# Patient Record
Sex: FEMALE
[De-identification: names, ages, dates, MRNs, and addresses within clinical notes are randomized per-mention and may not be internally consistent; named-entity substitution may affect disease eponyms.]

---

## 2017-04-09 ENCOUNTER — HOSPITAL ENCOUNTER (EMERGENCY)
Dept: HOSPITAL 42 - ED | Age: 47
Discharge: HOME | End: 2017-04-09
Payer: COMMERCIAL

## 2017-04-09 VITALS
RESPIRATION RATE: 16 BRPM | SYSTOLIC BLOOD PRESSURE: 136 MMHG | DIASTOLIC BLOOD PRESSURE: 80 MMHG | OXYGEN SATURATION: 98 % | HEART RATE: 84 BPM

## 2017-04-09 VITALS — TEMPERATURE: 97.7 F

## 2017-04-09 VITALS — BODY MASS INDEX: 60.2 KG/M2
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
## 2017-04-09 DIAGNOSIS — Z23: ICD-10-CM

## 2017-04-09 DIAGNOSIS — S69.91XA: Primary | ICD-10-CM
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
## 2017-04-09 LAB
ADD MANUAL DIFF?: NO
ALBUMIN/GLOB SERPL: 1 {RATIO} (ref 1.1–1.8)
ALP SERPL-CCNC: 124 U/L (ref 38–133)
ALT SERPL-CCNC: 38 U/L (ref 7–56)
AST SERPL-CCNC: 36 U/L (ref 15–39)
BASOPHILS # BLD AUTO: 0.01 K/MM3 (ref 0–2)
BASOPHILS NFR BLD: 0.1 % (ref 0–3)
BILIRUB SERPL-MCNC: 0.6 MG/DL (ref 0.2–1.3)
BUN SERPL-MCNC: 13 MG/DL (ref 7–21)
CALCIUM SERPL-MCNC: 9.7 MG/DL (ref 8.4–10.5)
CHLORIDE SERPL-SCNC: 100 MMOL/L (ref 98–107)
CO2 SERPL-SCNC: 26 MMOL/L (ref 21–33)
EOSINOPHIL # BLD: 0.2 10*3/UL (ref 0–0.7)
EOSINOPHIL NFR BLD: 2.2 % (ref 1.5–5)
ERYTHROCYTE [DISTWIDTH] IN BLOOD BY AUTOMATED COUNT: 16.4 % (ref 11.5–14.5)
GLOBULIN SER-MCNC: 4.2 GM/DL
GLUCOSE SERPL-MCNC: 141 MG/DL (ref 70–110)
GRANULOCYTES # BLD: 5.97 10*3/UL (ref 1.4–6.5)
GRANULOCYTES NFR BLD: 77.7 % (ref 50–68)
HCT VFR BLD CALC: 37.9 % (ref 36–48)
LYMPHOCYTES # BLD: 1.2 10*3/UL (ref 1.2–3.4)
LYMPHOCYTES NFR BLD AUTO: 16 % (ref 22–35)
MCH RBC QN AUTO: 27.3 PG (ref 25–35)
MCHC RBC AUTO-ENTMCNC: 32.5 G/DL (ref 31–37)
MCV RBC AUTO: 84.2 FL (ref 80–105)
MONOCYTES # BLD AUTO: 0.3 10*3/UL (ref 0.1–0.6)
MONOCYTES NFR BLD: 4 % (ref 1–6)
PLATELET # BLD: 295 10^3/UL (ref 120–450)
PMV BLD AUTO: 9.8 FL (ref 7–11)
POTASSIUM SERPL-SCNC: 4.3 MMOL/L (ref 3.6–5)
PROT SERPL-MCNC: 8.6 G/DL (ref 5.8–8.3)
SODIUM SERPL-SCNC: 138 MMOL/L (ref 132–148)
WBC # BLD AUTO: 7.7 10^3/UL (ref 4.5–11)

## 2017-04-09 NOTE — ED PDOC
Arrival/HPI





- General


Chief Complaint: Finger,Hand,&Wrist


Time Seen by Provider: 04/09/17 17:05


Historian: Patient





- History of Present Illness


Narrative History of Present Illness (Text): 


04/09/17 17:05


A 46 year old female, whose past medical history includes hypertension, 

lymphedema, Anemia (with previous blood transfusions) and DVT with IVC filter (

on xarelto), presents to the emergency department complaining of pain and 

swelling of the right middle finger since yesterday. Patient states yesterday 

while shopping she scraped her finger on a metal object. She reports this 

morning upon walking she felt weak and had a subjective fever with chills. 

Patient notes pain and swelling to the finger has decreased after taking Advil. 

She notes some nausea, chronic shortness of breath, and chronic lower extremity 

edema but denies any vomiting or any other complaints at this time. 








Time/Duration: 24 hours


Symptom Onset: Sudden


Symptom Course: Unchanged


Quality: Other ("pain")


Activities at Onset: Rest


Modifying Factors (Text): 


better with Advil


Context: Other


Associated Symptoms (Text): 


patient has nausea, fever, and chills





Past Medical History





- Provider Review


Nursing Documentation Reviewed: Yes





- Infectious Disease


Hx of Infectious Diseases: None





- Cardiac


Hx Hypertension: Yes





- Pulmonary


Hx Respiratory Disorders: No





- Neurological


Hx Neurological Disorder: No





- HEENT


Hx HEENT Disorder: No





- Renal


Hx Renal Disorder: No





- Endocrine/Metabolic


Hx Endocrine Disorders: No





- Hematological/Oncological


Hx Anemia: Yes





- Integumentary


Hx Dermatological Disorder: Yes


Other/Comment: b/l lower extremity edema.





- Musculoskeletal/Rheumatological


Hx Musculoskeletal Disorders: No





- Gastrointestinal


Hx Gastrointestinal Disorders: No





- Genitourinary/Gynecological


Hx Genitourinary Disorders: No





- Psychiatric


Hx Psychophysiologic Disorder: No


Hx Substance Use: No





- Surgical History


Other/Comment: B/LLE DVT, IVC filter, PE





- Anesthesia


Hx Anesthesia: No


Hx Anesthesia Reactions: No


Hx Malignant Hyperthermia: No





Family/Social History





- Physician Review


Nursing Documentation Reviewed: Yes


Family/Social History: Unknown Family HX


Smoking Status: Former Smoker


Hx Alcohol Use: No


Hx Substance Use: No





Allergies/Home Meds


Allergies/Adverse Reactions: 


Allergies





Penicillins Allergy (Verified 04/09/17 16:50)


 RASH











Review of Systems





- Physician Review


All systems were reviewed & negative as marked: Yes





- Review of Systems


Constitutional: Fevers, Other (chills)


Respiratory: SOB (chronic)


Gastrointestinal: Nausea


Musculoskeletal: Other (right hand middle finger pain and swelling)





Physical Exam


Vital Signs Reviewed: Yes


Vital Signs











  Temp Pulse Resp BP Pulse Ox


 


 04/09/17 16:51  97.7 F  89  19  133/76  97











Temperature: Afebrile


Blood Pressure: Normal


Pulse: Regular


Respiratory Rate: Normal


Appearance: Positive for: Well-Appearing, Non-Toxic, Comfortable, Other (

morbidly obese)


Pain Distress: None


Mental Status: Positive for: Alert and Oriented X 3





- Systems Exam


Head: Present: Atraumatic, Normocephalic


Pupils: Present: PERRL


Conjunctiva: Present: Normal


Mouth: Present: Moist Mucous Membranes


Neck: Present: Normal Range of Motion


Respiratory/Chest: Present: Clear to Auscultation, Good Air Exchange.  No: 

Respiratory Distress, Accessory Muscle Use


Cardiovascular: Present: Regular Rate and Rhythm, Normal S1, S2.  No: Murmurs


Abdomen: Present: Normal Bowel Sounds.  No: Tenderness, Distention, Peritoneal 

Signs


Back: Present: Normal Inspection


Upper Extremity: Present: Normal ROM, NORMAL PULSES, Neurovascularly Intact.  No

: Cyanosis, Edema, Tenderness, Erythema, Temperature Abnormalties


Lower Extremity: Present: Edema (lymphedema bilaterally)


Neurological: Present: GCS=15, CN II-XII Intact, Speech Normal


Skin: Present: Warm, Dry, Normal Color.  No: Rashes


Psychiatric: Present: Alert, Oriented x 3, Normal Insight, Normal Concentration





Medical Decision Making


ED Course and Treatment: 


04/09/17 17:42


Impression:


46 year old female complains of middle finger pain of right hand. 





Differential Diagnosis included but are not limited to: fracture vs. 

musculoskeletal





Plan:


-- Right Hand X-ray


-- Labs


-- TDAP Vaccine


-- Reassess and disposition





Progress Notes:





04/09/17 19:22


Right hand x-ray: As read by me, no foreign body or fracture.








04/09/17 20:18


Patient with noted history.  Vitals and labs are unremarkable.  X-ray shows no 

FB or fx.  Will give abx prophylaxis.  Tetanus updated - ok for d/c.





- Lab Interpretations


Lab Results: 








 04/09/17 18:15 





 04/09/17 18:15 





 Lab Results





04/09/17 18:15: WBC 7.7, RBC 4.50, Hgb 12.3, Hct 37.9, MCV 84.2, MCH 27.3, MCHC 

32.5, RDW 16.4 H, Plt Count 295, MPV 9.8, Gran % 77.7 H, Lymph % (Auto) 16.0 L, 

Mono % (Auto) 4.0, Eos % (Auto) 2.2, Baso % (Auto) 0.1, Gran # 5.97, Lymph # 1.2

, Mono # 0.3, Eos # 0.2, Baso # 0.01, Sodium 138, Potassium 4.3, Chloride 100, 

Carbon Dioxide 26, Anion Gap 16, BUN 13, Creatinine 0.7, Est GFR (African Amer) 

> 60, Est GFR (Non-Af Amer) > 60, Random Glucose 141 H, Calcium 9.7, Total 

Bilirubin 0.6, AST 36, ALT 38, Alkaline Phosphatase 124, Total Protein 8.6 H, 

Albumin 4.3, Globulin 4.2, Albumin/Globulin Ratio 1.0 L








I have reviewed the lab results: Yes





- RAD Interpretation


Radiology Orders: 








04/09/17 17:13


HAND RIGHT 3 VIEWS [RAD] Stat 














- Medication Orders


Current Medication Orders: 











Discontinued Medications





Tetanus/Reduced Diphtheria/Acell Pertussis (Boostrix Vaccine Inj)  0.5 ml IM 

.ONCE ONE


   Stop: 04/09/17 17:14


   Last Admin: 04/09/17 18:18  Dose: 0.5 ML





MAR Immunization Data


 Document     04/09/17 18:18  HI  (Rec: 04/09/17 18:18  HI  [facility] [place]-[id number])


     Immunization Data


      Vaccine Lot Number                         [id number]


      Vaccine Expiration Date                    03/21/19


      Site Given                                 Right Arm














- Scribe Statement


The provider has reviewed the documentation as recorded by the Carla Dobbs training under Karyna Valentino





All medical record entries made by the Carla were at my direction and 

personally dictated by me. I have reviewed the chart and agree that the record 

accurately reflects my personal performance of the history, physical exam, 

medical decision making, and the department course for this patient. I have 

also personally directed, reviewed, and agree with the discharge instructions 

and disposition.





Disposition/Present on Arrival





- Present on Arrival


Any Indicators Present on Arrival: No


History of DVT/PE: No


History of Uncontrolled Diabetes: No


Urinary Catheter: No


History of Decub. Ulcer: No


History Surgical Site Infection Following: None





- Disposition


Have Diagnosis and Disposition been Completed?: Yes


Diagnosis: 


 Injury of right hand


Disposition: HOME/ ROUTINE


Disposition Time: 20:20


Patient Plan: Discharge


Condition: GOOD


Additional Instructions: 


Take the antibiotics as prescribed.  Follow up with your primary care doctor.  

Return to the emergency department if any new concerning symptoms.


Prescriptions: 


Loratadine [Claritin] 1 tab PO DAILY PRN #30 tab


 PRN Reason: Allergy Symptoms


Azithromycin [Zithromax] 2 tab PO DAILY #6 tab


Referrals: 


PCP,NO [Primary Care Provider] - Follow up with primary

## 2017-04-10 NOTE — RAD
PROCEDURE:  Right Hand Radiographs.



HISTORY:

R hand puncture wound between 2nd/3rd finger



COMPARISON:

None.



FINDINGS:



BONES:

Bone alignment and mineralization are normal. No fracture. 



JOINTS:

Normal. No osteoarthritic changes. 



SOFT TISSUES:

Normal. There is no radiopaque foreign body. 



OTHER FINDINGS:

None.



IMPRESSION:

No acute fracture or dislocation. No radiopaque foreign body.

## 2017-05-19 ENCOUNTER — HOSPITAL ENCOUNTER (EMERGENCY)
Dept: HOSPITAL 42 - ED | Age: 47
LOS: 1 days | Discharge: HOME | End: 2017-05-20
Payer: COMMERCIAL

## 2017-05-19 VITALS — TEMPERATURE: 98 F

## 2017-05-19 VITALS — BODY MASS INDEX: 71.6 KG/M2

## 2017-05-19 DIAGNOSIS — Z87.891: ICD-10-CM

## 2017-05-19 DIAGNOSIS — I10: ICD-10-CM

## 2017-05-19 DIAGNOSIS — Z79.01: ICD-10-CM

## 2017-05-19 DIAGNOSIS — Z86.718: ICD-10-CM

## 2017-05-19 DIAGNOSIS — R60.9: Primary | ICD-10-CM

## 2017-05-19 LAB
ADD MANUAL DIFF?: NO
ALBUMIN/GLOB SERPL: 1.1 {RATIO} (ref 1.1–1.8)
ALP SERPL-CCNC: 106 U/L (ref 38–133)
ALT SERPL-CCNC: 40 U/L (ref 7–56)
AST SERPL-CCNC: 38 U/L (ref 15–39)
BASOPHILS # BLD AUTO: 0.01 K/MM3 (ref 0–2)
BASOPHILS NFR BLD: 0.1 % (ref 0–3)
BILIRUB SERPL-MCNC: 0.9 MG/DL (ref 0.2–1.3)
BUN SERPL-MCNC: 16 MG/DL (ref 7–21)
CALCIUM SERPL-MCNC: 9.6 MG/DL (ref 8.4–10.5)
CHLORIDE SERPL-SCNC: 98 MMOL/L (ref 98–107)
CO2 SERPL-SCNC: 28 MMOL/L (ref 21–33)
EOSINOPHIL # BLD: 0.2 10*3/UL (ref 0–0.7)
EOSINOPHIL NFR BLD: 2.3 % (ref 1.5–5)
ERYTHROCYTE [DISTWIDTH] IN BLOOD BY AUTOMATED COUNT: 15.5 % (ref 11.5–14.5)
GLOBULIN SER-MCNC: 3.8 GM/DL
GLUCOSE SERPL-MCNC: 173 MG/DL (ref 70–110)
GRANULOCYTES # BLD: 7.9 10*3/UL (ref 1.4–6.5)
GRANULOCYTES NFR BLD: 82.7 % (ref 50–68)
HCT VFR BLD CALC: 37.4 % (ref 36–48)
LYMPHOCYTES # BLD: 1.1 10*3/UL (ref 1.2–3.4)
LYMPHOCYTES NFR BLD AUTO: 11.8 % (ref 22–35)
MAGNESIUM SERPL-MCNC: 1.6 MG/DL (ref 1.7–2.2)
MCH RBC QN AUTO: 28.1 PG (ref 25–35)
MCHC RBC AUTO-ENTMCNC: 32.9 G/DL (ref 31–37)
MCV RBC AUTO: 85.6 FL (ref 80–105)
MONOCYTES # BLD AUTO: 0.3 10*3/UL (ref 0.1–0.6)
MONOCYTES NFR BLD: 3.1 % (ref 1–6)
PLATELET # BLD: 284 10^3/UL (ref 120–450)
PMV BLD AUTO: 9.4 FL (ref 7–11)
POTASSIUM SERPL-SCNC: 3.9 MMOL/L (ref 3.6–5)
PROT SERPL-MCNC: 8.1 G/DL (ref 5.8–8.3)
SODIUM SERPL-SCNC: 136 MMOL/L (ref 132–148)
TROPONIN I SERPL-MCNC: < 0.01 NG/ML
WBC # BLD AUTO: 9.6 10^3/UL (ref 4.5–11)

## 2017-05-19 PROCEDURE — 83615 LACTATE (LD) (LDH) ENZYME: CPT

## 2017-05-19 PROCEDURE — 80053 COMPREHEN METABOLIC PANEL: CPT

## 2017-05-19 PROCEDURE — 82550 ASSAY OF CK (CPK): CPT

## 2017-05-19 PROCEDURE — 85025 COMPLETE CBC W/AUTO DIFF WBC: CPT

## 2017-05-19 PROCEDURE — 99284 EMERGENCY DEPT VISIT MOD MDM: CPT

## 2017-05-19 PROCEDURE — 84484 ASSAY OF TROPONIN QUANT: CPT

## 2017-05-19 PROCEDURE — 83735 ASSAY OF MAGNESIUM: CPT

## 2017-05-19 PROCEDURE — 93005 ELECTROCARDIOGRAM TRACING: CPT

## 2017-05-19 PROCEDURE — 93970 EXTREMITY STUDY: CPT

## 2017-05-19 PROCEDURE — 83880 ASSAY OF NATRIURETIC PEPTIDE: CPT

## 2017-05-19 PROCEDURE — 71010: CPT

## 2017-05-19 PROCEDURE — 96374 THER/PROPH/DIAG INJ IV PUSH: CPT

## 2017-05-20 VITALS
SYSTOLIC BLOOD PRESSURE: 128 MMHG | OXYGEN SATURATION: 98 % | RESPIRATION RATE: 18 BRPM | DIASTOLIC BLOOD PRESSURE: 85 MMHG | HEART RATE: 95 BPM

## 2017-05-20 NOTE — RAD
HISTORY:

leg swelling  



COMPARISON:

No prior. 



FINDINGS:



LUNGS:

No active pulmonary disease.



PLEURA:

No significant pleural effusion identified, no pneumothorax apparent.



CARDIOVASCULAR:

Normal.



OSSEOUS STRUCTURES:

No significant abnormalities.



VISUALIZED UPPER ABDOMEN:

Normal.



OTHER FINDINGS:

None.



IMPRESSION:

No active disease.

## 2017-05-20 NOTE — ED PDOC
Arrival/HPI





- General


Chief Complaint: Lower Extremity Problem/Injury


Time Seen by Provider: 05/19/17 21:35


Historian: Patient





- History of Present Illness


Narrative History of Present Illness (Text): 


05/20/17 00:04


A 46 year old female, obese, presents to the emergency department complaining 

of increasing leg pain and swelling over the past several days. Patient denies 

chest pain, shortness of breath, fever, cough or any other complaints at this 

time. Patient has a history of DVT in the past. Patient reports is non 

compliant with Xarelto for 7 months. 


Time/Duration: < week


Symptom Onset: Sudden


Symptom Course: Unchanged


Activities at Onset: Rest


Context: Home


Associated Symptoms (Text): 


none





Past Medical History





- Provider Review


Nursing Documentation Reviewed: Yes





- Infectious Disease


Hx of Infectious Diseases: None





- Cardiac


Hx Hypertension: Yes





- Pulmonary


Hx Respiratory Disorders: Yes


Other/Comment: "spots in the lungs"





- Neurological


Hx Neurological Disorder: No





- HEENT


Hx HEENT Disorder: No





- Renal


Hx Renal Disorder: No





- Endocrine/Metabolic


Hx Endocrine Disorders: No





- Hematological/Oncological


Hx Anemia: Yes


Other/Comment: DVT





- Integumentary


Hx Dermatological Disorder: Yes


Other/Comment: b/l lower extremity edema.





- Musculoskeletal/Rheumatological


Hx Musculoskeletal Disorders: No





- Gastrointestinal


Hx Gastrointestinal Disorders: No





- Genitourinary/Gynecological


Hx Genitourinary Disorders: No





- Psychiatric


Hx Psychophysiologic Disorder: No


Hx Substance Use: No





- Surgical History


Other/Comment: B/LLE DVT, IVC filter, PE





- Anesthesia


Hx Anesthesia: Yes


Hx Anesthesia Reactions: No


Hx Malignant Hyperthermia: No





Family/Social History





- Physician Review


Nursing Documentation Reviewed: Yes


Family/Social History: No Known Family HX


Smoking Status: Former Smoker


Hx Alcohol Use: No


Hx Substance Use: No





Allergies/Home Meds


Allergies/Adverse Reactions: 


Allergies





Penicillins Allergy (Verified 04/09/17 16:50)


 RASH











Review of Systems





- Physician Review


All systems were reviewed & negative as marked: Yes





- Review of Systems


Constitutional: absent: Fevers


Respiratory: absent: SOB, Cough


Cardiovascular: absent: Chest Pain


Musculoskeletal: Other (leg pain and swelling)





Physical Exam


Vital Signs Reviewed: Yes


Vital Signs











  Temp Pulse Resp BP Pulse Ox


 


 05/20/17 01:10   95 H  18  128/85  98


 


 05/19/17 20:57  98.0 F  109 H  20  121/85  96











Temperature: Afebrile


Blood Pressure: Normal


Pulse: Tachycardic


Respiratory Rate: Normal


Appearance: Positive for: Non-Toxic, Comfortable, Other (obese)


Pain Distress: None


Mental Status: Positive for: Alert and Oriented X 3





- Systems Exam


Head: Present: Atraumatic, Normocephalic


Pupils: Present: PERRL


Extroacular Muscles: Present: EOMI


Conjunctiva: Present: Normal


Mouth: Present: Moist Mucous Membranes


Neck: Present: Normal Range of Motion


Respiratory/Chest: Present: Clear to Auscultation, Good Air Exchange.  No: 

Respiratory Distress, Accessory Muscle Use


Cardiovascular: Present: Tachycardic


Abdomen: Present: Normal Bowel Sounds.  No: Tenderness, Distention, Peritoneal 

Signs


Back: Present: Normal Inspection


Upper Extremity: Present: Normal Inspection.  No: Cyanosis, Edema


Lower Extremity: Present: Edema (positive), Other (chronic skin changes; good 

capillary refill; good distal pulses)


Neurological: Present: GCS=15, CN II-XII Intact, Speech Normal


Skin: Present: Warm, Dry, Normal Color.  No: Rashes


Psychiatric: Present: Alert, Oriented x 3, Normal Insight, Normal Concentration





Medical Decision Making


ED Course and Treatment: 


05/19/17 23:55


Impression:


A 46 year old female with leg pain and swelling.





Differential Diagnosis included but are not limited to:  





Plan:


-- EKG


-- chest xray


-- US lower extremity


-- labs


-- Urinalysis


-- Toradol


-- Reassess and disposition





Prior Visits:


Notes and results from previous visits were reviewed.


Patient last reported to the emergency department on 4/9/17 for evaluation of 

pain and swelling of the right middle finger. Patient was discharged and 

advised to follow up with PMD and to take antibiotics as prescribed.





Progress Notes:


EKG:


Ordered, reviewed, and independently interpreted the EKG.


Rate : 97 BPM


Rhythm : NSR


Interpretation : Normal axis, normal intervals 


Comparison : No previous EKG for comparison.











- Lab Interpretations


Lab Results: 








 05/19/17 22:55 





 05/19/17 22:55 





 Lab Results





05/19/17 22:55: Sodium 136, Potassium 3.9, Chloride 98, Carbon Dioxide 28, 

Anion Gap 14, BUN 16, Creatinine 0.8, Est GFR (African Amer) > 60, Est GFR (Non-

Af Amer) > 60, Random Glucose 173 H, Calcium 9.6, Magnesium 1.6 L, Total 

Bilirubin 0.9, AST 38, ALT 40, Alkaline Phosphatase 106, Lactate Dehydrogenase 

428, Total Creatine Kinase 48, Troponin I < 0.01, NT-Pro-B Natriuret Pep 20.8, 

Total Protein 8.1, Albumin 4.3, Globulin 3.8, Albumin/Globulin Ratio 1.1


05/19/17 22:55: WBC 9.6  D, RBC 4.37, Hgb 12.3, Hct 37.4, MCV 85.6, MCH 28.1, 

MCHC 32.9, RDW 15.5 H, Plt Count 284, MPV 9.4, Gran % 82.7 H, Lymph % (Auto) 

11.8 L, Mono % (Auto) 3.1, Eos % (Auto) 2.3, Baso % (Auto) 0.1, Gran # 7.90 H, 

Lymph # 1.1 L, Mono # 0.3, Eos # 0.2, Baso # 0.01








I have reviewed the lab results: Yes





- RAD Interpretation


Radiology Orders: 








05/19/17 21:40


CHEST PORTABLE [RAD] Stat 





05/19/17 22:22


DUPLEX LOWER EXTRM VEIN BILAT [US] Stat 














- EKG Interpretation


Interpreted by ED Physician: Yes


Type: 12 lead EKG





- Medication Orders


Current Medication Orders: 











Discontinued Medications





Ketorolac Tromethamine (Toradol)  30 mg IVP STAT STA


   Stop: 05/19/17 22:29


   Last Admin: 05/20/17 01:09  Dose: 30 mg











- Scribe Statement


The provider has reviewed the documentation as recorded by the Carla Dobbs





Provider Scribe Attestation:


All medical record entries made by the Carla were at my direction and 

personally dictated by me. I have reviewed the chart and agree that the record 

accurately reflects my personal performance of the history, physical exam, 

medical decision making, and the department course for this patient. I have 

also personally directed, reviewed, and agree with the discharge instructions 

and disposition.











Disposition/Present on Arrival





- Present on Arrival


Any Indicators Present on Arrival: No


History of DVT/PE: No


History of Uncontrolled Diabetes: No


Urinary Catheter: No


History of Decub. Ulcer: No


History Surgical Site Infection Following: None





- Disposition


Have Diagnosis and Disposition been Completed?: Yes


Diagnosis: 


 Lower extremity edema





Disposition: HOME/ ROUTINE


Disposition Time: 00:20


Condition: GOOD


Discharge Instructions (ExitCare):  Leg Edema (ED)


Additional Instructions: 





Thank you for letting us take care of you today. Your provider was Dr. Benito. You were treated for leg edema. The emergency medical care you 

received today was directed at your acute symptoms. If you were prescribed any 

medication, please fill it and take as directed. It may take several days for 

your symptoms to resolve. Return to the Emergency Department if your symptoms 

worsen, do not improve, or if you have any other problems.





Please contact your doctor or call one of the physicians/clinics you have been 

referred to that are listed on the Patient Visit Information form that is 

included in your discharge packet. Bring any paperwork you were given at 

discharge with you along with any medications you are taking to your follow up 

visit. Our treatment cannot replace ongoing medical care by a primary care 

provider (PCP) outside of the emergency department.





Thank you for allowing the [facility] [employer] team to be part of your care today.














Follow up with your doctor in 3-4 days for re-evaluation.


Prescriptions: 


Bumetanide [Bumex] 1 mg PO DAILY #14 tab


Referrals: 


[employer] [facility], [Family Provider] - Follow up with primary

## 2017-05-20 NOTE — CARD
--------------- APPROVED REPORT --------------





EKG Measurement

Heart [id number]

TX [id number]

[id number]

[id number]

[id number]



<Conclusion>

Normal sinus rhythm

Minimal voltage criteria for LVH, may be normal variant

Borderline ECG

## 2017-05-21 NOTE — US
HISTORY:

Leg pain and swelling. Evaluate for DVT



PHYSICIAN(S):  Hesham Pardo MD.



TECHNIQUE:

Duplex sonography and color-flow Doppler with graded compression were 

used to evaluate the deep venous systems of both lower extremities. 

The exam is extremely limited by body habitus and edema. The lower 

femoral veins and tibial veins are not adequately seen



FINDINGS:

The visualized deep venous systems of both lower extremities are 

sonographically normal and compressible. Normal wave forms and 

augmentation are seen. There is no sonographic evidence for deep 

venous thrombosis in the visualized segments of both lower 

extremities.



IMPRESSION:

No sonographic evidence for deep venous thrombosis in the visualized 

segments of both lower extremities. 



Very limited study

## 2019-01-28 ENCOUNTER — HOSPITAL ENCOUNTER (EMERGENCY)
Dept: HOSPITAL 42 - ED | Age: 49
Discharge: HOME | End: 2019-01-28
Payer: COMMERCIAL

## 2019-01-28 VITALS — HEART RATE: 70 BPM | OXYGEN SATURATION: 98 %

## 2019-01-28 VITALS — RESPIRATION RATE: 18 BRPM | SYSTOLIC BLOOD PRESSURE: 116 MMHG | DIASTOLIC BLOOD PRESSURE: 96 MMHG

## 2019-01-28 VITALS — BODY MASS INDEX: 61.9 KG/M2

## 2019-01-28 DIAGNOSIS — I10: ICD-10-CM

## 2019-01-28 DIAGNOSIS — L03.116: Primary | ICD-10-CM

## 2019-01-28 DIAGNOSIS — E11.9: ICD-10-CM

## 2019-01-28 DIAGNOSIS — Z87.891: ICD-10-CM

## 2019-01-28 LAB
ALBUMIN SERPL-MCNC: 4.5 G/DL (ref 3–4.8)
ALBUMIN/GLOB SERPL: 1.1 {RATIO} (ref 1.1–1.8)
ALT SERPL-CCNC: 27 U/L (ref 7–56)
APTT BLD: 42.5 SECONDS (ref 26.9–38.3)
AST SERPL-CCNC: 27 U/L (ref 14–36)
BASOPHILS # BLD AUTO: 0.01 K/MM3 (ref 0–2)
BASOPHILS NFR BLD: 0.1 % (ref 0–3)
BUN SERPL-MCNC: 16 MG/DL (ref 7–21)
CALCIUM SERPL-MCNC: 9.9 MG/DL (ref 8.4–10.5)
EOSINOPHIL # BLD: 0.3 10*3/UL (ref 0–0.7)
EOSINOPHIL NFR BLD: 2.8 % (ref 1.5–5)
ERYTHROCYTE [DISTWIDTH] IN BLOOD BY AUTOMATED COUNT: 16 % (ref 11.5–14.5)
GFR NON-AFRICAN AMERICAN: > 60
HGB BLD-MCNC: 11.4 G/DL (ref 12–16)
INR PPP: 1.15
LYMPHOCYTES # BLD: 1.3 10*3/UL (ref 1.2–3.4)
LYMPHOCYTES NFR BLD AUTO: 13.9 % (ref 22–35)
MCH RBC QN AUTO: 26.2 PG (ref 25–35)
MCHC RBC AUTO-ENTMCNC: 30.9 G/DL (ref 31–37)
MCV RBC AUTO: 84.8 FL (ref 80–105)
MONOCYTES # BLD AUTO: 0.4 10*3/UL (ref 0.1–0.6)
MONOCYTES NFR BLD: 4.2 % (ref 1–6)
PLATELET # BLD: 295 10^3/UL (ref 120–450)
PMV BLD AUTO: 9.8 FL (ref 7–11)
PROTHROMBIN TIME: 12.8 SECONDS (ref 9.4–12.5)
RBC # BLD AUTO: 4.35 10^6/UL (ref 3.5–6.1)
WBC # BLD AUTO: 9.6 10^3/UL (ref 4.5–11)

## 2019-01-28 NOTE — ED PDOC
Arrival/HPI





- General


Historian: Patient





- History of Present Illness


Narrative History of Present Illness (Text): 


Patient is a 48 yr old female with PMH DM, lymphedema, depression and DVT/PE 

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 

left lateral calf.  patient states that a few days ago she had an itch in the 

area and scratched it.  She states that following this an infection began to 

develop with a scab. She otherwise denies and f/c, n/v, CP, SOB, abdominal pain,

extremity pain/weakness.


01/28/19 17:32








Time/Duration: Prior to Arrival, 1 week


Symptom Onset: Gradual


Symptom Course: Worsening


Quality: Aching


Severity Level: 5


Activities at Onset: Rest





<Kathleen Alvarado - Last Filed: 01/28/19 18:05>





<Yung Stout - Last Filed: 01/28/19 21:22>





- General


Chief Complaint: Lower Extremity Problem/Injury


Time Seen by Provider: 01/28/19 17:12





Past Medical History





- Provider Review


Nursing Documentation Reviewed: Yes





- Infectious Disease


Hx of Infectious Diseases: None





- Cardiac


Hx Hypertension: Yes





- Pulmonary


Hx Respiratory Disorders: Yes


Other/Comment: "spots in the lungs"





- Neurological


Hx Neurological Disorder: No





- HEENT


Hx HEENT Disorder: No





- Renal


Hx Renal Disorder: No





- Endocrine/Metabolic


Hx Endocrine Disorders: No





- Hematological/Oncological


Hx Anemia: Yes


Other/Comment: DVT





- Integumentary


Hx Dermatological Disorder: Yes


Other/Comment: b/l lower extremity edema.





- Musculoskeletal/Rheumatological


Hx Musculoskeletal Disorders: No





- Gastrointestinal


Hx Gastrointestinal Disorders: No





- Genitourinary/Gynecological


Hx Genitourinary Disorders: No





- Psychiatric


Hx Psychophysiologic Disorder: No


Hx Substance Use: No





- Surgical History


Other/Comment: B/LLE DVT, IVC filter, PE





- Anesthesia


Hx Anesthesia: Yes


Hx Anesthesia Reactions: No


Hx Malignant Hyperthermia: No





<Kathleen Alvarado - Last Filed: 01/28/19 18:05>





Family/Social History





- Physician Review


Nursing Documentation Reviewed: Yes


Family/Social History: Unknown Family HX


Smoking Status: Former Smoker


Hx Alcohol Use: No


Hx Substance Use: No





<Kathleen Alvarado - Last Filed: 01/28/19 18:05>





Allergies/Home Meds





<Kathleen Alvarado - Last Filed: 01/28/19 18:05>





<Yung Stout - Last Filed: 01/28/19 21:22>


Allergies/Adverse Reactions: 


Allergies





Penicillins Allergy (Verified 01/28/19 20:17)


   RASH











Review of Systems





- Physician Review


All systems were reviewed & negative as marked: Yes





- Review of Systems


Constitutional: absent: Fatigue, Fevers


Respiratory: absent: SOB


Cardiovascular: absent: Chest Pain


Gastrointestinal: absent: Abdominal Pain, Constipation, Diarrhea, Nausea, 

Vomiting


Musculoskeletal: absent: Arthralgias, Joint Swelling


Skin: Skin Lesions (1.5 cm x 1.5 cm wound right lateral calf).  absent: Rash


Neurological: Headache.  absent: Dizziness, Speech Changes, Facial Droop


Psychiatric: Anxiety





<Kathleen Alvarado - Last Filed: 01/28/19 18:05>





Physical Exam


Vital Signs Reviewed: Yes


Appearance: Positive for: Well-Appearing, Non-Toxic, Comfortable


Pain Distress: Mild


Mental Status: Positive for: Alert and Oriented X 3





- Systems Exam


Head: Present: Atraumatic, Normocephalic


Extroacular Muscles: Present: EOMI


Mouth: Present: Moist Mucous Membranes


Neck: Present: Normal Range of Motion


Respiratory/Chest: Present: Clear to Auscultation, Good Air Exchange.  No: 

Respiratory Distress, Accessory Muscle Use


Cardiovascular: Present: Regular Rate and Rhythm, Normal S1, S2.  No: Murmurs


Abdomen: Present: Normal Bowel Sounds.  No: Tenderness, Distention, Peritoneal 

Signs


Upper Extremity: Present: Normal Inspection.  No: Cyanosis, Edema


Lower Extremity: Present: Normal Inspection, NORMAL PULSES, Other (1.5 cm x1.5 

cm wound to right lateral calf with area of fibrinous exudate and central scab, 

surrounding erythema and induration no fluctuance).  No: Edema, CALF TENDERNESS


Neurological: Present: GCS=15, CN II-XII Intact, Speech Normal


Skin: Present: Warm, Dry, Erythematous, Induration, Other (1.5 cm x1.5 cm wound 

to right lateral calf with area of fibrinous exudate and central scab, 

surrounding erythema and induration no fluctuance).  No: Normal Color, 

Diaphoretic (over area on right calf)


Psychiatric: Present: Alert, Oriented x 3, Normal Insight, Normal Concentration,

Anxious





<Kathleen Alvarado - Last Filed: 01/28/19 18:05>





Medical Decision Making


ED Course and Treatment: 


Impression: 48 yr old female with PMH DM, lymphedema, depression and DVT/PE (not

compliant on xarelto for several years) who presents with right lateral calf wo

und





Plan:


tylenol


CBC


CMP


PT PTT INR


lower ext US bilaterally


reassess and dispo


01/28/19 18:29














- RAD Interpretation


Radiology Orders: 











01/28/19 17:30


DUPLEX LOWER EXTRM VEIN BILAT [US] Stat 














<Kathleen Alvarado - Last Filed: 01/28/19 18:05>


ED Course and Treatment: 





01/28/19 20:00


Patient reassessed and has no complaints at this time. Discussion on the 

etiology for cause of the calf wound with emphasis on chronicity and need for 

continuous dressings made aware to patient. She acknowledges and is in agreement

 for plan to discharge at this time. Scripts provided and opportunity for 

answering questions given. She is stable for discharge. 








- Lab Interpretations


Lab Results: 





                                        











PT  12.8 SECONDS (9.4-12.5)  H  01/28/19  18:00    


 


INR  1.15   01/28/19  18:00    


 


APTT  42.5 Seconds (26.9-38.3)  H  01/28/19  18:00    








                                        











Total Bilirubin  0.4 mg/dL (0.2-1.3)   01/28/19  18:00    


 


AST  27 U/L (14-36)   01/28/19  18:00    


 


ALT  27 U/L (7-56)   01/28/19  18:00    


 


Alkaline Phosphatase  120 U/L ()   01/28/19  18:00    


 


Total Protein  8.7 g/dL (5.8-8.3)  H  01/28/19  18:00    


 


Albumin  4.5 g/dL (3.0-4.8)   01/28/19  18:00    


 


Globulin  4.2 gm/dL  01/28/19  18:00    


 


Albumin/Globulin Ratio  1.1  (1.1-1.8)   01/28/19  18:00    

















                                 01/28/19 18:00 





                                 01/28/19 18:00 





                                   Lab Results





01/28/19 18:00: Sodium 137, Potassium 4.5, Chloride 101, Carbon Dioxide 28, A

nion Gap 12, BUN 16, Creatinine 0.7, Est GFR (African Amer) > 60, Est GFR (Non-

Af Amer) > 60, Random Glucose 91, Calcium 9.9, Total Bilirubin 0.4, AST 27, ALT 

27, Alkaline Phosphatase 120, Total Protein 8.7 H, Albumin 4.5, Globulin 4.2, 

Albumin/Globulin Ratio 1.1


01/28/19 18:00: PT 12.8 H, INR 1.15, APTT 42.5 H


01/28/19 18:00: WBC 9.6, RBC 4.35, Hgb 11.4 L, Hct 36.9, MCV 84.8, MCH 26.2, 

MCHC 30.9 L, RDW 16.0 H, Plt Count 295, MPV 9.8, Neut % (Auto) 79.0 H, Lymph % 

(Auto) 13.9 L, Mono % (Auto) 4.2, Eos % (Auto) 2.8, Baso % (Auto) 0.1, Lymph # 

(Auto) 1.3, Mono # (Auto) 0.4, Eos # (Auto) 0.3, Baso # (Auto) 0.01, Absolute 

Neuts (auto) 7.56 H








I have reviewed the lab results: Yes





- RAD Interpretation


Radiology Orders: 











01/28/19 17:30


DUPLEX LOWER EXTRM VEIN BILAT [US] Stat 














- Medication Orders


Current Medication Orders: 














Discontinued Medications





Acetaminophen (Tylenol 325mg Tab)  650 mg PO STAT STA


   Stop: 01/28/19 17:32


   Last Admin: 01/28/19 18:24  Dose: 650 mg

















<Waldemar Stoutyl - Last Filed: 01/28/19 21:22>





Disposition/Present on Arrival





- Present on Arrival


History of DVT/PE: No


History of Uncontrolled Diabetes: No


Urinary Catheter: No


History Surgical Site Infection Following: None





<Kathleen Alvarado - Last Filed: 01/28/19 18:05>





- Present on Arrival


Any Indicators Present on Arrival: No





- Disposition


Have Diagnosis and Disposition been Completed?: Yes


Disposition Time: 19:24


Patient Plan: Discharge





<Waldemar Stoutyl - Last Filed: 01/28/19 21:22>





- Disposition


Diagnosis: 


 Cellulitis





Disposition: HOME/ ROUTINE


Patient Problems: 


                             Current Active Problems











Problem Status Onset


 


Cellulitis Acute 











Condition: STABLE


Discharge Instructions (ExitCare):  Cellulitis (ED)


Print Language: ENGLISH


Additional Instructions: 


Please follow up with your PCP in 1 week


Please take medications as prescribed


If symptoms worsen(purulence, fevers, redness, persistent pain despite 

analgesics), please return back to the ER promptly


Prescriptions: 


RX: Clindamycin [Cleocin] 300 mg PO TID 10 Days #30 cap


RX: Mupirocin 2% Ointment [Bactroban Ointment] 22 applic EXT BID #1 tube


Referrals: 


Sylvester Cortez MD [Family Provider] - Follow up with primary


Forms:  [employer] (English)

## 2019-01-28 NOTE — US
HISTORY:

Leg pain and swelling. Evaluate for DVT



PHYSICIAN(S):  Hesham Pardo MD.



TECHNIQUE:

Duplex sonography and color-flow Doppler with graded compression were 

used to evaluate the deep venous systems of both lower extremities. 

Evelia is stream limited due to body habitus and edema.  The lower 

femoral veins and tibial veins are not adequately seen



FINDINGS:

The visualized deep venous systems of both lower extremities are 

sonographically normal and compressible. Normal wave forms and 

augmentation are seen. There is no sonographic evidence for deep 

venous thrombosis in the visualized segments of both lower 

extremities.



IMPRESSION:

No sonographic evidence for deep venous thrombosis in the visualized 

segments of both lower extremities. 



Very limited study